# Patient Record
Sex: MALE | Race: WHITE | URBAN - METROPOLITAN AREA
[De-identification: names, ages, dates, MRNs, and addresses within clinical notes are randomized per-mention and may not be internally consistent; named-entity substitution may affect disease eponyms.]

---

## 2023-09-06 ENCOUNTER — APPOINTMENT (OUTPATIENT)
Dept: URBAN - METROPOLITAN AREA CLINIC 295 | Age: 43
Setting detail: DERMATOLOGY
End: 2023-09-06

## 2023-09-06 PROBLEM — C44.719 BASAL CELL CARCINOMA OF SKIN OF LEFT LOWER LIMB, INCLUDING HIP: Status: ACTIVE | Noted: 2023-09-06

## 2023-09-06 PROBLEM — C43.62 MALIGNANT MELANOMA OF LEFT UPPER LIMB, INCLUDING SHOULDER: Status: ACTIVE | Noted: 2023-09-06

## 2023-09-06 PROCEDURE — 99213 OFFICE O/P EST LOW 20 MIN: CPT

## 2023-09-06 PROCEDURE — OTHER COUNSELING: OTHER

## 2023-09-06 PROCEDURE — OTHER DIAGNOSIS COMMENT: OTHER

## 2023-09-06 PROCEDURE — OTHER MIPS QUALITY: OTHER

## 2023-09-06 PROCEDURE — OTHER ADDITIONAL NOTES: OTHER

## 2023-09-06 NOTE — HPI: OTHER
Condition:: SRT consultation/discussion
Please Describe Your Condition:: Patient presents today for a consultation for basal cell carcinoma on left anterior leg.

## 2023-09-06 NOTE — PROCEDURE: DIAGNOSIS COMMENT
Comment: Biopsied by Dr. Jd Austin on 7/24/2023. Superficial and nodular subtype. No prior treatment.
Render Risk Assessment In Note?: no
Detail Level: Simple
Comment: pT4b pNx pMx (Breslow index 6 mm). Status post excisional biopsy by Dr. Jd Austin on 7/24/2023. Patient has been referred to [facility] for further management, where additional diagnostic testing and PET/CT have been planned, per patient.
Detail Level: Detailed

## 2023-09-06 NOTE — PROCEDURE: ADDITIONAL NOTES
Additional Notes: We discussed treatment options with patient including image-guided superficial radiation therapy (IGSRT). Jay Larson ([facility]) also came into the room to discuss treatment option and anticipated coverage. Patient would like to think further about treatment options before coming to a decision and will Jay Larson and/or Dr. Austin's office once he has decided.  At patient's request, other treatment options were mentioned as well, including excision, electrodesiccation and curettage, and topical therapies (specifically imiquimod cream). If patient declines IGSRT, I am happy to send in a prescription for imiquimod cream, but we will discuss first with Dr. Austin and have patient return to see Dr. Austin if he prefers excision or curettage (unless otherwise specified by Dr. Austin). Additional Notes: We discussed treatment options with patient including image-guided superficial radiation therapy (IGSRT). Jay Larson ([facility]) also came into the room to discuss treatment option and anticipated coverage. Patient would like to think further about treatment options before coming to a decision and will Jay Larson and/or Dr. Austin's office once he has decided.  At patient's request, other treatment options were mentioned as well, including excision, electrodesiccation and curettage, and topical therapies (specifically imiquimod cream). If patient declines IGSRT, I am happy to send in a prescription for imiquimod cream, but we will discuss first with Dr. Austin and have patient return to see Dr. Austin if he prefers excision or curettage (unless otherwise specified by Dr. Austin).